# Patient Record
Sex: FEMALE | URBAN - METROPOLITAN AREA
[De-identification: names, ages, dates, MRNs, and addresses within clinical notes are randomized per-mention and may not be internally consistent; named-entity substitution may affect disease eponyms.]

---

## 2017-03-08 ENCOUNTER — APPOINTMENT (RX ONLY)
Dept: URBAN - METROPOLITAN AREA CLINIC 161 | Facility: CLINIC | Age: 77
Setting detail: DERMATOLOGY
End: 2017-03-08

## 2017-03-08 DIAGNOSIS — D22 MELANOCYTIC NEVI: ICD-10-CM

## 2017-03-08 DIAGNOSIS — L82.1 OTHER SEBORRHEIC KERATOSIS: ICD-10-CM

## 2017-03-08 DIAGNOSIS — L81.4 OTHER MELANIN HYPERPIGMENTATION: ICD-10-CM

## 2017-03-08 DIAGNOSIS — Z71.89 OTHER SPECIFIED COUNSELING: ICD-10-CM

## 2017-03-08 PROBLEM — E03.9 HYPOTHYROIDISM, UNSPECIFIED: Status: ACTIVE | Noted: 2017-03-08

## 2017-03-08 PROBLEM — J30.1 ALLERGIC RHINITIS DUE TO POLLEN: Status: ACTIVE | Noted: 2017-03-08

## 2017-03-08 PROBLEM — M12.9 ARTHROPATHY, UNSPECIFIED: Status: ACTIVE | Noted: 2017-03-08

## 2017-03-08 PROBLEM — D22.9 MELANOCYTIC NEVI, UNSPECIFIED: Status: ACTIVE | Noted: 2017-03-08

## 2017-03-08 PROBLEM — L55.1 SUNBURN OF SECOND DEGREE: Status: ACTIVE | Noted: 2017-03-08

## 2017-03-08 PROCEDURE — 99202 OFFICE O/P NEW SF 15 MIN: CPT

## 2017-03-08 PROCEDURE — ? COUNSELING

## 2017-03-08 PROCEDURE — ? REFERRAL CORRESPONDENCE

## 2021-05-07 ENCOUNTER — OFFICE VISIT (OUTPATIENT)
Dept: URBAN - METROPOLITAN AREA CLINIC 51 | Facility: CLINIC | Age: 81
End: 2021-05-07
Payer: MEDICARE

## 2021-05-07 DIAGNOSIS — E11.9 TYPE 2 DIABETES MELLITUS WITHOUT COMPLICATIONS: ICD-10-CM

## 2021-05-07 PROCEDURE — 92020 GONIOSCOPY: CPT | Performed by: OPHTHALMOLOGY

## 2021-05-07 PROCEDURE — 76514 ECHO EXAM OF EYE THICKNESS: CPT | Performed by: OPHTHALMOLOGY

## 2021-05-07 PROCEDURE — 92083 EXTENDED VISUAL FIELD XM: CPT | Performed by: OPHTHALMOLOGY

## 2021-05-07 PROCEDURE — 92250 FUNDUS PHOTOGRAPHY W/I&R: CPT | Performed by: OPHTHALMOLOGY

## 2021-05-07 PROCEDURE — 99204 OFFICE O/P NEW MOD 45 MIN: CPT | Performed by: OPHTHALMOLOGY

## 2021-05-07 RX ORDER — DORZOLAMIDE HYDROCHLORIDE AND TIMOLOL MALEATE 20; 5 MG/ML; MG/ML
SOLUTION/ DROPS OPHTHALMIC
Qty: 1 | Refills: 3 | Status: INACTIVE
Start: 2021-05-07 | End: 2021-05-07

## 2021-05-07 RX ORDER — LATANOPROST 50 UG/ML
0.005 % SOLUTION OPHTHALMIC
Qty: 1 | Refills: 3 | Status: INACTIVE
Start: 2021-05-07 | End: 2021-07-26

## 2021-05-07 NOTE — IMPRESSION/PLAN
Impression: Primary open-angle glaucoma, bilateral, indeterminate stage: K87.6181.
- DENIES:   Heart or Lung Problems, Sleep Apnea, Hx of Migraines 
- POSITIVE: Fam hx ( mom, gm) ; sulfa  Plan: PLAN: ON Brimonidine TID OS; PAchs are thick,  Photos- poor quality, and VFT has nasal depression os and iop is sub-optimal os so will adjust meds: cont brim bid os, add xal qhs OS ( rechallenge for time-being) , add cosopt bid os ( pt reports allergies in past but no asthma- discussed side effects)  and rtc 1 week for iop recheck, may recheck in general clinic then return to glaucoma as necessary - still needs dfe once iop improved   ((TARGET ~< to determine OU))     gtts sheet given 5/7/21 TESTS:  
5/7/21 Pachymetry - OD: thick and decreased risk; OS: thick and decreased risk; will use to follow 5/7/21 Visual Field - OD: Poor-overall full; OS: Poor-nasal depression 5/7/21 Photo Disc - OD: Poor-cupping; no hemes; OS Poor-cupping no hemes Discussed glaucoma diagnosis in detail with patient. Emphasized and explained compliance. Poor compliance can lead to blindness.  Educational materials provided

## 2021-05-07 NOTE — IMPRESSION/PLAN
Impression: Type 2 diabetes mellitus without complications: R71.8. Plan: Discussed diet, exercise, nutrition. Good blood sugar and blood pressure control. Maintain follow up with PCP.

## 2021-05-07 NOTE — IMPRESSION/PLAN
Impression: Puckering of macula, bilateral: H35.373.  Plan: Monitor for now., hx of ppv ou for floaters

## 2021-05-13 ENCOUNTER — OFFICE VISIT (OUTPATIENT)
Dept: URBAN - METROPOLITAN AREA CLINIC 51 | Facility: CLINIC | Age: 81
End: 2021-05-13
Payer: MEDICARE

## 2021-05-13 DIAGNOSIS — H40.1134 PRIMARY OPEN-ANGLE GLAUCOMA, BILATERAL, INDETERMINATE STAGE: Primary | ICD-10-CM

## 2021-05-13 PROCEDURE — 99204 OFFICE O/P NEW MOD 45 MIN: CPT | Performed by: OPTOMETRIST

## 2021-05-13 RX ORDER — BRIMONIDINE TARTRATE 2 MG/ML
0.2 % SOLUTION/ DROPS OPHTHALMIC
Qty: 15 | Refills: 3 | Status: INACTIVE
Start: 2021-05-13 | End: 2021-07-30

## 2021-05-13 ASSESSMENT — INTRAOCULAR PRESSURE
OD: 18
OS: 23
OS: 18

## 2021-05-13 NOTE — IMPRESSION/PLAN
Impression: Primary open-angle glaucoma, bilateral, indeterminate stage: W67.0910.
- DENIES:   Heart or Lung Problems, Sleep Apnea, Hx of Migraines 
- POSITIVE: Fam hx ( mom, gm) ; sulfa 5/7/21 Pachymetry - OD: thick and decreased risk; OS: thick and decreased risk; will use to follow 5/7/21 Visual Field - OD: Poor-overall full; OS: Poor-nasal depression 5/7/21 Photo Disc - OD: Poor-cupping; no hemes; OS Poor-cupping no hemes Plan: IOPs today 18mmHg OD and 18mmHg OS with medication. Improvement since addition of Brimonidine and Cosopt. Patient to continue with Brimonidine TID OU, Latanoprost QHS OU and Cosopt BID OU. No refills needed per patient. RTC as scheduled.

## 2021-06-15 ENCOUNTER — OFFICE VISIT (OUTPATIENT)
Dept: URBAN - METROPOLITAN AREA CLINIC 51 | Facility: CLINIC | Age: 81
End: 2021-06-15
Payer: MEDICARE

## 2021-06-15 DIAGNOSIS — H43.813 VITREOUS DEGENERATION, BILATERAL: ICD-10-CM

## 2021-06-15 DIAGNOSIS — Z96.1 PRESENCE OF INTRAOCULAR LENS: ICD-10-CM

## 2021-06-15 DIAGNOSIS — H35.372 PUCKERING OF MACULA, LEFT EYE: ICD-10-CM

## 2021-06-15 DIAGNOSIS — H35.373 PUCKERING OF MACULA, BILATERAL: ICD-10-CM

## 2021-06-15 PROCEDURE — 99214 OFFICE O/P EST MOD 30 MIN: CPT | Performed by: OPTOMETRIST

## 2021-06-15 PROCEDURE — 92133 CPTRZD OPH DX IMG PST SGM ON: CPT | Performed by: OPTOMETRIST

## 2021-06-15 PROCEDURE — 92134 CPTRZ OPH DX IMG PST SGM RTA: CPT | Performed by: OPTOMETRIST

## 2021-06-15 ASSESSMENT — KERATOMETRY
OS: 43.02
OD: 42.97

## 2021-06-15 ASSESSMENT — VISUAL ACUITY
OD: 20/30
OS: 20/30

## 2021-06-15 ASSESSMENT — INTRAOCULAR PRESSURE
OS: 15
OD: 16

## 2021-06-15 NOTE — IMPRESSION/PLAN
Impression: Puckering of macula, left eye: H35.372.  Plan: mild, monitor 
macular oct 
not visually significant

## 2021-06-15 NOTE — IMPRESSION/PLAN
Impression: Primary open-angle glaucoma, bilateral, indeterminate stage: H40.1134.  Plan: rnfl normal OD , abnormal OS 
h/o IOP 32 OS 
continue brimonidine bid OU 
latanoprost qhs OU  and cosopt bid OS
4 mos HVF and IOP

## 2021-06-15 NOTE — IMPRESSION/PLAN
Impression: Presence of intraocular lens: Z96.1. Plan: MF IOL OU 
wearing glasses wishes to be evaluated for PRK monovision

## 2021-10-20 ENCOUNTER — OFFICE VISIT (OUTPATIENT)
Dept: URBAN - METROPOLITAN AREA CLINIC 51 | Facility: CLINIC | Age: 81
End: 2021-10-20
Payer: MEDICARE

## 2021-10-20 DIAGNOSIS — H40.1122 PRIMARY OPEN-ANGLE GLAUCOMA, MODERATE STAGE, LEFT EYE: Primary | ICD-10-CM

## 2021-10-20 DIAGNOSIS — H01.001 UNSPECIFIED BLEPARITIS OF RIGHT UPPER EYELID: ICD-10-CM

## 2021-10-20 DIAGNOSIS — H01.002 UNSPECIFIED BLEPHARITIS OF RIGHT LOWER EYELID: ICD-10-CM

## 2021-10-20 DIAGNOSIS — H01.005 UNSPECIFIED BLEPHARITIS OF LEFT LOWER EYELID: ICD-10-CM

## 2021-10-20 DIAGNOSIS — H01.004 UNSPECIFIED BLEPHARITIS OF LEFT UPPER EYELID: ICD-10-CM

## 2021-10-20 DIAGNOSIS — H40.011 OPEN ANGLE WITH BORDERLINE FINDINGS, LOW RISK, RIGHT EYE: ICD-10-CM

## 2021-10-20 PROCEDURE — 92083 EXTENDED VISUAL FIELD XM: CPT | Performed by: OPTOMETRIST

## 2021-10-20 PROCEDURE — 92133 CPTRZD OPH DX IMG PST SGM ON: CPT | Performed by: OPTOMETRIST

## 2021-10-20 PROCEDURE — 99214 OFFICE O/P EST MOD 30 MIN: CPT | Performed by: OPTOMETRIST

## 2021-10-20 RX ORDER — PREDNISOLONE ACETATE 10 MG/ML
1 % SUSPENSION/ DROPS OPHTHALMIC
Qty: 10 | Refills: 3 | Status: INACTIVE
Start: 2021-10-20 | End: 2021-12-23

## 2021-10-20 ASSESSMENT — INTRAOCULAR PRESSURE
OD: 19
OS: 32

## 2021-10-20 NOTE — IMPRESSION/PLAN
Impression: Open angle with borderline findings, low risk, right eye: H40.011.  Plan: latanoprost qhs OU 
brimonidine bid OD

## 2021-10-20 NOTE — IMPRESSION/PLAN
Impression: Primary iridocyclitis of left eye: H20.012. Plan: start pred qid OS 
explained in detail needs to control inflammation in the eye 
no srx until iritis clears up.

## 2021-10-20 NOTE — IMPRESSION/PLAN
Impression: Primary open-angle glaucoma, moderate stage, left eye: H40.1122. iritis today OS 
elevated iop Plan: add rhopressa qd OS 
cosopt bid OS 
latanoprost qhs OS 
brimondine (pt wishes to use it tid ) OS 
rnfl abnormal 
HVF defects unreliable iop check 1 week

## 2021-10-27 ENCOUNTER — OFFICE VISIT (OUTPATIENT)
Dept: URBAN - METROPOLITAN AREA CLINIC 51 | Facility: CLINIC | Age: 81
End: 2021-10-27
Payer: MEDICARE

## 2021-10-27 DIAGNOSIS — H52.4 PRESBYOPIA: ICD-10-CM

## 2021-10-27 PROCEDURE — 99213 OFFICE O/P EST LOW 20 MIN: CPT | Performed by: OPTOMETRIST

## 2021-10-27 ASSESSMENT — INTRAOCULAR PRESSURE
OD: 18
OS: 19

## 2021-10-27 NOTE — IMPRESSION/PLAN
Impression: Primary iridocyclitis of left eye: H20.012. Plan: Continue PRED [id number], [id number], [id number]
continue Brimonidine till next visit IOP 18/19 today

## 2021-11-08 ENCOUNTER — OFFICE VISIT (OUTPATIENT)
Dept: URBAN - METROPOLITAN AREA CLINIC 51 | Facility: CLINIC | Age: 81
End: 2021-11-08
Payer: MEDICARE

## 2021-11-08 DIAGNOSIS — H20.012 PRIMARY IRIDOCYCLITIS OF LEFT EYE: Primary | ICD-10-CM

## 2021-11-08 PROCEDURE — 99213 OFFICE O/P EST LOW 20 MIN: CPT | Performed by: OPTOMETRIST

## 2021-11-08 RX ORDER — BRIMONIDINE TARTRATE 2 MG/ML
0.2 % SOLUTION/ DROPS OPHTHALMIC
Qty: 15 | Refills: 3 | Status: ACTIVE
Start: 2021-11-08

## 2021-11-08 ASSESSMENT — VISUAL ACUITY
OD: 20/30
OS: 20/30

## 2021-11-08 ASSESSMENT — KERATOMETRY
OD: 43.47
OS: 42.74

## 2021-11-08 NOTE — IMPRESSION/PLAN
Impression: Primary iridocyclitis of left eye: H20.012. Plan: restart PRED, [id number]
continue Brimonidine RTC 2wks for check

## 2021-12-23 ENCOUNTER — OFFICE VISIT (OUTPATIENT)
Dept: URBAN - METROPOLITAN AREA CLINIC 51 | Facility: CLINIC | Age: 81
End: 2021-12-23
Payer: MEDICARE

## 2021-12-23 PROCEDURE — 99214 OFFICE O/P EST MOD 30 MIN: CPT | Performed by: OPTOMETRIST

## 2021-12-23 RX ORDER — NETARSUDIL 0.2 MG/ML
0.02 % SOLUTION/ DROPS OPHTHALMIC; TOPICAL
Qty: 7.5 | Refills: 1 | Status: INACTIVE
Start: 2021-12-23 | End: 2021-12-28

## 2021-12-23 ASSESSMENT — INTRAOCULAR PRESSURE
OS: 28
OD: 21

## 2021-12-23 NOTE — IMPRESSION/PLAN
Impression: Primary open-angle glaucoma, moderate stage, left eye: H40.1122. iritis today OS 
elevated iop Plan: IOPs 21/28
add rhopressa qd OS 
cosopt bid OS 
latanoprost qhs OS 
brimondine bid OS 
rnfl abnormal 
HVF defects unreliable Pt refuses to take pred Glaucoma team next available

## 2021-12-23 NOTE — IMPRESSION/PLAN
Impression: Primary iridocyclitis of left eye: H20.012. Plan: pt refused pred , feels allergic 
feels change in vision due to eye pressure Kps, cells and pigment in [facility] 
will refer to glc

## 2022-05-09 ENCOUNTER — OFFICE VISIT (OUTPATIENT)
Dept: URBAN - METROPOLITAN AREA CLINIC 51 | Facility: CLINIC | Age: 82
End: 2022-05-09
Payer: MEDICARE

## 2022-05-09 DIAGNOSIS — H20.012 PRIMARY IRIDOCYCLITIS OF LEFT EYE: ICD-10-CM

## 2022-05-09 DIAGNOSIS — H40.1124 PRIMARY OPEN-ANGLE GLAUCOMA, LEFT EYE, INDETERMINATE STAGE: Primary | ICD-10-CM

## 2022-05-09 DIAGNOSIS — H43.813 VITREOUS DEGENERATION, BILATERAL: ICD-10-CM

## 2022-05-09 DIAGNOSIS — H35.372 PUCKERING OF MACULA, LEFT EYE: ICD-10-CM

## 2022-05-09 DIAGNOSIS — Z96.1 PRESENCE OF INTRAOCULAR LENS: ICD-10-CM

## 2022-05-09 PROCEDURE — 92083 EXTENDED VISUAL FIELD XM: CPT | Performed by: OPHTHALMOLOGY

## 2022-05-09 PROCEDURE — 99214 OFFICE O/P EST MOD 30 MIN: CPT | Performed by: OPHTHALMOLOGY

## 2022-05-09 ASSESSMENT — INTRAOCULAR PRESSURE
OD: 18
OS: 25

## 2022-05-09 NOTE — IMPRESSION/PLAN
Impression: Vitreous degeneration, bilateral Plan: s/p PPV OU. Discussed signs and symptoms of retinal detachment (flashes,floaters, curtain) as precaution. Patient instructed to call or return to clinic if condition gets worse.

## 2022-05-09 NOTE — IMPRESSION/PLAN
Impression: Primary iridocyclitis of left eye: H20.012.  Plan: Per Dr. Nidhi Adam pt refused pred , feels allergic 
old Kps,  appears more quiet today - avoid pg's;  if persists or worsens,  would rec eval w uveitis/DrElGasim for further eval

## 2022-05-09 NOTE — IMPRESSION/PLAN
Impression: Primary open-angle glaucoma, left eye, indeterminate stage: C05.3417. Plan: PLAN:  on Rhopressa qd OS, OFF Latanoprost qhs OS, on Cosopt bid OS(used @ 2 am, 12pm) , on Brimonidine TID+ OS(whenever feels like it ?) ; VFT may have poor rel ou and  IOP may be sub-optimal ou; remain off xal due to iritis; cont current meds - clarified regimen:  rhopressa qhs os, cosopt bid os, brim tid os,  but may have drop sensitivities,  will rec eval w Dr Chery Sharp for additional options - told to bring gtts each visit TESTS:  
5/9/22 Visual Field - OD: Fair-rim depression; OS: Fair-rim depression Discussed glaucoma diagnosis in detail with patient. Emphasized and explained compliance. Poor compliance can lead to blindness.  Educational materials provided

## 2022-05-09 NOTE — IMPRESSION/PLAN
Impression: Puckering of macula, left eye: H35.372. Plan: Discussed signs and symptoms of retinal detachment (flashes,floaters, curtain) as precaution. Patient instructed to call or return to clinic if condition gets worse.

## 2022-08-05 ENCOUNTER — OFFICE VISIT (OUTPATIENT)
Dept: URBAN - METROPOLITAN AREA CLINIC 51 | Facility: CLINIC | Age: 82
End: 2022-08-05
Payer: MEDICARE

## 2022-08-05 DIAGNOSIS — H20.012 PRIMARY IRIDOCYCLITIS OF LEFT EYE: ICD-10-CM

## 2022-08-05 DIAGNOSIS — H40.1124 PRIMARY OPEN-ANGLE GLAUCOMA, LEFT EYE, INDETERMINATE STAGE: Primary | ICD-10-CM

## 2022-08-05 PROCEDURE — 92020 GONIOSCOPY: CPT | Performed by: OPHTHALMOLOGY

## 2022-08-05 PROCEDURE — 99213 OFFICE O/P EST LOW 20 MIN: CPT | Performed by: OPHTHALMOLOGY

## 2022-08-05 PROCEDURE — 92133 CPTRZD OPH DX IMG PST SGM ON: CPT | Performed by: OPHTHALMOLOGY

## 2022-08-05 RX ORDER — LATANOPROST 0.05 MG/ML
0.005 % SOLUTION/ DROPS OPHTHALMIC; TOPICAL
Qty: 7.5 | Refills: 1 | Status: ACTIVE
Start: 2022-08-05

## 2022-08-05 ASSESSMENT — INTRAOCULAR PRESSURE
OS: 29
OD: 19

## 2022-08-05 NOTE — IMPRESSION/PLAN
Impression: Primary open-angle glaucoma, left eye, indeterminate stage: B72.5408. Plan: IOP is elevated OU, recommend lowing IOP. discussed additional drops vs laser treatment. discussed risks due to prior iritis. She states latanoprost was stopped because it burned, not because of inflammation so will start xelpros QHS OS. 
Continue: Rhopressa QHS OS,  Cosopt bid OS , increase Brimonidine to TID OS; Start Xelpros QHS OU
RTC in 1-2 months for IOP check Dr. Jayashree Xavier

## 2022-09-16 ENCOUNTER — OFFICE VISIT (OUTPATIENT)
Dept: URBAN - METROPOLITAN AREA CLINIC 44 | Facility: CLINIC | Age: 82
End: 2022-09-16
Payer: MEDICARE

## 2022-09-16 DIAGNOSIS — H43.813 VITREOUS DEGENERATION, BILATERAL: ICD-10-CM

## 2022-09-16 DIAGNOSIS — H40.1124 PRIMARY OPEN-ANGLE GLAUCOMA, LEFT EYE, INDETERMINATE STAGE: Primary | ICD-10-CM

## 2022-09-16 DIAGNOSIS — H20.012 PRIMARY IRIDOCYCLITIS OF LEFT EYE: ICD-10-CM

## 2022-09-16 DIAGNOSIS — H35.372 PUCKERING OF MACULA, LEFT EYE: ICD-10-CM

## 2022-09-16 PROCEDURE — 99214 OFFICE O/P EST MOD 30 MIN: CPT | Performed by: OPHTHALMOLOGY

## 2022-09-16 RX ORDER — BRIMONIDINE TARTRATE 2 MG/ML
0.2 % SOLUTION/ DROPS OPHTHALMIC
Qty: 15 | Refills: 3 | Status: ACTIVE
Start: 2022-09-16

## 2022-09-16 RX ORDER — LATANOPROST 0.05 MG/ML
0.005 % SOLUTION/ DROPS OPHTHALMIC; TOPICAL
Qty: 7.5 | Refills: 1 | Status: ACTIVE
Start: 2022-09-16

## 2022-09-16 RX ORDER — NETARSUDIL 0.2 MG/ML
0.02 % SOLUTION/ DROPS OPHTHALMIC; TOPICAL
Qty: 7.5 | Refills: 1 | Status: ACTIVE
Start: 2022-09-16

## 2022-09-16 RX ORDER — DORZOLAMIDE HYDROCHLORIDE AND TIMOLOL MALEATE 20; 5 MG/ML; MG/ML
SOLUTION/ DROPS OPHTHALMIC
Qty: 15 | Refills: 3 | Status: ACTIVE
Start: 2022-09-16

## 2022-09-16 ASSESSMENT — INTRAOCULAR PRESSURE
OS: 21
OD: 18

## 2022-09-16 NOTE — IMPRESSION/PLAN
Impression: Primary open-angle glaucoma, left eye, indeterminate stage: P62.5033. Plan: PLAN: On Rhopressa QHS OS, ON Xelpros QHS OS (from Latanoprost), on Cosopt BID OS, on Brimonidine BID OS (should be TID OS); IOP has improved; started on xelpros by Dr Tamiko Choudhury , but has history of uveitis, would avoid if possible, increase brim to tid and avoid xelpros for now , keep others the same and rec kei Motley for additional options in 2-4 weeks : - told to bring gtts each visit TESTS:  
5/9/22 Visual Field - OD: Fair-rim depression; OS: Fair-rim depression Discussed glaucoma diagnosis in detail with patient. Emphasized and explained compliance. Poor compliance can lead to blindness.  Educational materials provided

## 2022-09-16 NOTE — IMPRESSION/PLAN
Impression: Primary iridocyclitis of left eye: H20.012.  Plan: Per Dr. Markham Apley pt refused pred , feels allergic 
old Kps,  appears more quiet today - avoid pg's;  if persists or worsens,  would rec eval w uveitis/DrElGasim for further eval

## 2022-12-13 ENCOUNTER — OFFICE VISIT (OUTPATIENT)
Dept: URBAN - METROPOLITAN AREA CLINIC 51 | Facility: CLINIC | Age: 82
End: 2022-12-13
Payer: MEDICARE

## 2022-12-13 DIAGNOSIS — H20.012 PRIMARY IRIDOCYCLITIS OF LEFT EYE: Primary | ICD-10-CM

## 2022-12-13 DIAGNOSIS — H40.1122 PRIMARY OPEN-ANGLE GLAUCOMA, MODERATE STAGE, LEFT EYE: ICD-10-CM

## 2022-12-13 PROCEDURE — 99213 OFFICE O/P EST LOW 20 MIN: CPT | Performed by: OPTOMETRIST

## 2022-12-13 ASSESSMENT — INTRAOCULAR PRESSURE
OS: 32
OD: 17

## 2022-12-13 NOTE — IMPRESSION/PLAN
Impression: Primary iridocyclitis of left eye: H20.012.  Plan:  pt refused pred , feels allergic 
old Kps,  appears more quiet today - avoid pg's;  if persists or worsens,  would rec eval w uveitis/DrElGasim for further eval

## 2022-12-13 NOTE — IMPRESSION/PLAN
Impression: Primary open-angle glaucoma, moderate stage, left eye: H40.1122. Plan: possible secondary glc related to iritis 
rnfl/ gca thining OS 
cupping OS> OD 
iop 32 today 
pt has d/c drops on own due to irritation and redness. will refer for possible laser or surgical procedure to avoid use of drops.

## 2022-12-30 ENCOUNTER — OFFICE VISIT (OUTPATIENT)
Dept: URBAN - METROPOLITAN AREA CLINIC 51 | Facility: CLINIC | Age: 82
End: 2022-12-30
Payer: MEDICARE

## 2022-12-30 DIAGNOSIS — H35.372 PUCKERING OF MACULA, LEFT EYE: ICD-10-CM

## 2022-12-30 DIAGNOSIS — H40.1122 PRIMARY OPEN-ANGLE GLAUCOMA, MODERATE STAGE, LEFT EYE: Primary | ICD-10-CM

## 2022-12-30 DIAGNOSIS — H20.012 PRIMARY IRIDOCYCLITIS OF LEFT EYE: ICD-10-CM

## 2022-12-30 DIAGNOSIS — H43.813 VITREOUS DEGENERATION, BILATERAL: ICD-10-CM

## 2022-12-30 DIAGNOSIS — H40.011 OPEN ANGLE WITH BORDERLINE FINDINGS, LOW RISK, RIGHT EYE: ICD-10-CM

## 2022-12-30 PROCEDURE — 99214 OFFICE O/P EST MOD 30 MIN: CPT | Performed by: STUDENT IN AN ORGANIZED HEALTH CARE EDUCATION/TRAINING PROGRAM

## 2022-12-30 PROCEDURE — 92083 EXTENDED VISUAL FIELD XM: CPT | Performed by: STUDENT IN AN ORGANIZED HEALTH CARE EDUCATION/TRAINING PROGRAM

## 2022-12-30 PROCEDURE — 92020 GONIOSCOPY: CPT | Performed by: STUDENT IN AN ORGANIZED HEALTH CARE EDUCATION/TRAINING PROGRAM

## 2022-12-30 RX ORDER — PREDNISOLONE ACETATE 10 MG/ML
1 % SUSPENSION/ DROPS OPHTHALMIC
Qty: 10 | Refills: 1 | Status: ACTIVE
Start: 2022-12-30

## 2022-12-30 RX ORDER — GENTAMICIN SULFATE 3 MG/ML
0.3 % SOLUTION/ DROPS OPHTHALMIC
Qty: 5 | Refills: 1 | Status: ACTIVE
Start: 2022-12-30

## 2022-12-30 ASSESSMENT — INTRAOCULAR PRESSURE
OS: 33
OD: 18

## 2022-12-30 NOTE — IMPRESSION/PLAN
Impression: Primary open-angle glaucoma, moderate stage, left eye: H40.1122. OAG 2/2 inflammation Plan: Ocular Surgical History: s/p PPVIT OU Pachy:  614 OU Tmax:  21 / 32 Target IOP: mid-low teens OU Med Allergies: pt stopped all gtts due to irritation/redness Heart / Lung / Kidney disease/sulfa allergy: Pt. denies Gonioscopy: OD 2+ pigment grade 4 : OS 2+ pigment small inf pas grade 4
OCT: (8/5/22) 85 / 71 HVF: (12/30/22) OD: Fair-rim depression; OS: Fair-rim depression C/D: 0.65 / 0.75 Better seeing eye:  OS
IOP Today: 18 / 33 Plan: IOP today is okay OD, but elevated OS. IOP is too high OS for amount of glaucoma present at this time. Recommend lowering IOP OS. Discussed Omni vs Tube shunt. Glaucoma Tube Shunt OS (baerveldt) is recommended. Glaucoma diagnosis and associated vision loss discussed at length. R/B/A of surgery discussed including pain, bleeding, loss of vision, loss of eye, need for further surgery, double vision, retinal problems, infection, and inflammation. Although surgery is expected to improve the condition, the patient understands it is possible that the condition could worsen in the future. The surgery is being done in an attempt to preserve the current level of vision; vision improvement is not expected. Discussed in detail the commitment of post operative care, patient may need to travel to different offices. Discussed post-op activity restrictions: no bending head below waist, rubbing the eye, straining or lifting over 10 lbs, no swimming, stay out of randy, dirty areas and shield when sleeping until advised. All questions answered. Pt understand and wishes to proceed. See Елена Hart to schedule Baerveldt tube shunt with patch graft in the LEFT eye, H&P,  POD1, POW1, POW3 and POW5-6. 
***Clearance? ?? 
 - Educational material provided. - ERx'd Ofloxacin QID OS, and Prednisolone Acetate QID OS as requested. - Continue Glaucoma medications as directed before surgery. After surgery, patient will stop glaucoma drops ONLY in the surgical eye. Start surgical drops when eye patch is removed 5hrs after surgery. Drops:  pt stopped all gtts due to irritation and redness Discussed natural history of glaucoma and that irreversible vision loss can occur without adequate IOP control.

## 2022-12-30 NOTE — IMPRESSION/PLAN
Impression: Vitreous degeneration, bilateral Plan: s/p PPV OU. Discussed natural history of PVD. Reviewed signs and symptoms of a Retinal Detachment. Patient instructed to call if symptoms should arise.

## 2022-12-30 NOTE — IMPRESSION/PLAN
Impression: Primary iridocyclitis of left eye: H20.012.
-avoid PGAs Plan: No inflammation present today. Recommend consult w/ Dr. Adam Huerta is retimoteo. Monitor.

## 2023-01-12 ENCOUNTER — SURGERY (OUTPATIENT)
Dept: URBAN - METROPOLITAN AREA SURGERY 28 | Facility: LOCATION | Age: 83
End: 2023-01-12
Payer: MEDICARE

## 2023-01-12 PROCEDURE — 66180 AQUEOUS SHUNT EYE W/GRAFT: CPT | Performed by: STUDENT IN AN ORGANIZED HEALTH CARE EDUCATION/TRAINING PROGRAM

## 2023-01-13 ENCOUNTER — POST-OPERATIVE VISIT (OUTPATIENT)
Dept: URBAN - METROPOLITAN AREA CLINIC 51 | Facility: CLINIC | Age: 83
End: 2023-01-13
Payer: MEDICARE

## 2023-01-13 DIAGNOSIS — Z48.810 ENCOUNTER FOR SURGICAL AFTERCARE FOLLOWING SURGERY ON A SENSE ORGAN: Primary | ICD-10-CM

## 2023-01-13 PROCEDURE — 99024 POSTOP FOLLOW-UP VISIT: CPT | Performed by: OPTOMETRIST

## 2023-01-13 ASSESSMENT — INTRAOCULAR PRESSURE
OS: 19
OS: 15

## 2023-01-13 NOTE — IMPRESSION/PLAN
Impression: S/P Shunt:  Baerveldt; TUTOPLAST PROCESSED PERICARDIUM OS - 1 Day. Encounter for surgical aftercare following surgery on a sense organ  Z48.810. Plan: Doing well 1 day post op. ST tube shunt with good position. IOP doing excellent OS (15). Subconjunctival hemorrhage will resolve with time, however, can take upwards of a few weeks. Swelling and inflammation noted today. VA should continue to improve as swelling and inflammation resolves. Restrictions apply for 1 week. Drop instructions: Pred QID then taper + Ofloxacin QID x 2 weeks (wait at least 5 minutes in between drops). AT as needed between medicated drops (sample if Ivizia dispensed to patient) RTC as scheduled for 1 week post op appointment

## 2023-01-19 ENCOUNTER — POST-OPERATIVE VISIT (OUTPATIENT)
Dept: URBAN - METROPOLITAN AREA CLINIC 51 | Facility: CLINIC | Age: 83
End: 2023-01-19
Payer: MEDICARE

## 2023-01-19 DIAGNOSIS — Z48.810 ENCOUNTER FOR SURGICAL AFTERCARE FOLLOWING SURGERY ON A SENSE ORGAN: Primary | ICD-10-CM

## 2023-01-19 PROCEDURE — 99024 POSTOP FOLLOW-UP VISIT: CPT | Performed by: OPTOMETRIST

## 2023-01-19 PROCEDURE — 92134 CPTRZ OPH DX IMG PST SGM RTA: CPT | Performed by: OPTOMETRIST

## 2023-01-19 RX ORDER — PREDNISOLONE ACETATE 10 MG/ML
1 % SUSPENSION/ DROPS OPHTHALMIC
Qty: 10 | Refills: 1 | Status: ACTIVE
Start: 2023-01-19

## 2023-01-19 RX ORDER — ATROPINE SULFATE 10 MG/ML
1 % SOLUTION/ DROPS OPHTHALMIC
Qty: 5 | Refills: 0 | Status: ACTIVE
Start: 2023-01-19

## 2023-01-19 ASSESSMENT — INTRAOCULAR PRESSURE
OD: 15
OS: 3

## 2023-01-20 NOTE — IMPRESSION/PLAN
Impression: S/P Shunt:  Baerveldt; TUTOPLAST PROCESSED PERICARDIUM OS - 7 Days. Encounter for surgical aftercare following surgery on a sense organ  Z48.810. Plan: IOP remains low and now developing choroidal.
Discussed with patient. Verified with Dr. Elizabeth Soto -- start atropine bid OS and increase pred 6x OS. 

RTC next week with OD; prefer Friday as Dr. Elizabeth Soto is in clinic if needed

## 2023-01-27 ENCOUNTER — POST-OPERATIVE VISIT (OUTPATIENT)
Dept: URBAN - METROPOLITAN AREA CLINIC 51 | Facility: CLINIC | Age: 83
End: 2023-01-27
Payer: MEDICARE

## 2023-01-27 DIAGNOSIS — Z48.810 ENCOUNTER FOR SURGICAL AFTERCARE FOLLOWING SURGERY ON A SENSE ORGAN: Primary | ICD-10-CM

## 2023-01-27 PROCEDURE — 99024 POSTOP FOLLOW-UP VISIT: CPT

## 2023-01-27 ASSESSMENT — INTRAOCULAR PRESSURE
OS: 7
OS: 12

## 2023-02-07 ENCOUNTER — OFFICE VISIT (OUTPATIENT)
Facility: LOCATION | Age: 83
End: 2023-02-07
Payer: MEDICARE

## 2023-02-07 DIAGNOSIS — Z48.810 ENCOUNTER FOR SURGICAL AFTERCARE FOLLOWING SURGERY ON A SENSE ORGAN: ICD-10-CM

## 2023-02-07 DIAGNOSIS — H40.1122 PRIMARY OPEN-ANGLE GLAUCOMA, MODERATE STAGE, LEFT EYE: Primary | ICD-10-CM

## 2023-02-07 DIAGNOSIS — H20.012 PRIMARY IRIDOCYCLITIS OF LEFT EYE: ICD-10-CM

## 2023-02-07 PROCEDURE — 99024 POSTOP FOLLOW-UP VISIT: CPT | Performed by: STUDENT IN AN ORGANIZED HEALTH CARE EDUCATION/TRAINING PROGRAM

## 2023-02-07 ASSESSMENT — INTRAOCULAR PRESSURE
OD: 17
OS: 6
OS: 32

## 2023-02-07 NOTE — IMPRESSION/PLAN
Impression: Primary iridocyclitis of left eye: H20.012. Plan: Eye is quiet today, PF 6x a day for post-surgical care.

## 2023-02-13 ENCOUNTER — OFFICE VISIT (OUTPATIENT)
Dept: URBAN - METROPOLITAN AREA CLINIC 44 | Facility: CLINIC | Age: 83
End: 2023-02-13
Payer: MEDICARE

## 2023-02-13 DIAGNOSIS — H20.012 PRIMARY IRIDOCYCLITIS OF LEFT EYE: ICD-10-CM

## 2023-02-13 DIAGNOSIS — Z48.810 ENCOUNTER FOR SURGICAL AFTERCARE FOLLOWING SURGERY ON A SENSE ORGAN: Primary | ICD-10-CM

## 2023-02-13 PROCEDURE — 99024 POSTOP FOLLOW-UP VISIT: CPT | Performed by: OPHTHALMOLOGY

## 2023-02-13 ASSESSMENT — INTRAOCULAR PRESSURE
OD: 15
OS: 6

## 2023-02-13 NOTE — IMPRESSION/PLAN
Impression: S/P Shunt:  Baerveldt; TUTOPLAST PROCESSED PERICARDIUM OS. Encounter for surgical aftercare following surgery on a sense organ  Z48.810. Plan: Doing well with mild K edema OS, sutures intact Baljit negative, wound intact I suspect tube shunt may be partially open - challenging to see shunt base due to deep set orbit Return to Dr. Devin Strange for continued care in 2 weeks - went tube should be close to opening Increase PF to QID and continue atropine BID OS

## 2023-03-10 ENCOUNTER — OFFICE VISIT (OUTPATIENT)
Dept: URBAN - METROPOLITAN AREA CLINIC 51 | Facility: CLINIC | Age: 83
End: 2023-03-10
Payer: MEDICARE

## 2023-03-10 DIAGNOSIS — Z48.810 ENCOUNTER FOR SURGICAL AFTERCARE FOLLOWING SURGERY ON A SENSE ORGAN: Primary | ICD-10-CM

## 2023-03-10 PROCEDURE — 99024 POSTOP FOLLOW-UP VISIT: CPT | Performed by: STUDENT IN AN ORGANIZED HEALTH CARE EDUCATION/TRAINING PROGRAM

## 2023-03-10 ASSESSMENT — INTRAOCULAR PRESSURE
OD: 16
OS: 11

## 2023-03-10 NOTE — IMPRESSION/PLAN
Impression: S/P Shunt:  Baerveldt; TUTOPLAST PROCESSED PERICARDIUM OS. Encounter for surgical aftercare following surgery on a sense organ  Z48.810. Plan: Ocular Surgical History: s/p IOL OU, PPVIT OU, Baerveldt tube OS Pachy:  381/648 Tmax:  21/33 Target IOP: mid-low teens OU Med Allergies: pt stopped all gtts due to irritation/redness Heart / Lung / Kidney disease/sulfa allergy: Pt. denies Gonioscopy: OD 2+ pigment grade 4 : OS 2+ pigment small inf pas grade 4
OCT: (8/5/22) 85 wnl/71 early pole thinning HVF: (12/30/22) OD: MD 0, early NS inf changes/OS: MD -3 early NS inf changes, sup step C/D: .65/.75 Better seeing eye:  OS
IOP Today: 16/11 Plan: IOP is doing well. Patient to use PF AFT OU often for dryness and irritation. Will continue to monitor. *Activity restrictions are lifted* Drops: STOP Atropine, DECREASE Prednisolone from QID to TID OS

## 2023-04-11 ENCOUNTER — POST-OPERATIVE VISIT (OUTPATIENT)
Dept: URBAN - METROPOLITAN AREA CLINIC 51 | Facility: CLINIC | Age: 83
End: 2023-04-11
Payer: MEDICARE

## 2023-04-11 DIAGNOSIS — Z48.810 ENCOUNTER FOR SURGICAL AFTERCARE FOLLOWING SURGERY ON A SENSE ORGAN: Primary | ICD-10-CM

## 2023-04-11 PROCEDURE — 99024 POSTOP FOLLOW-UP VISIT: CPT | Performed by: OPTOMETRIST

## 2023-04-11 ASSESSMENT — INTRAOCULAR PRESSURE
OD: 17
OS: 14

## 2023-04-11 NOTE — IMPRESSION/PLAN
Impression: S/P Shunt:  Baerveldt; TUTOPLAST PROCESSED PERICARDIUM OS - 89 Days. Encounter for surgical aftercare following surgery on a sense organ  Z48.810. Plan: IOP doing well s/p shunt. No cells. Will have patient decrease Pred to BID. AT's as needed for comfort. RTC in 1 month for IOP check.

## 2023-05-23 ENCOUNTER — OFFICE VISIT (OUTPATIENT)
Dept: URBAN - METROPOLITAN AREA CLINIC 51 | Facility: CLINIC | Age: 83
End: 2023-05-23
Payer: MEDICARE

## 2023-05-23 DIAGNOSIS — H40.011 OPEN ANGLE WITH BORDERLINE FINDINGS, LOW RISK, RIGHT EYE: ICD-10-CM

## 2023-05-23 DIAGNOSIS — H40.1122 PRIMARY OPEN-ANGLE GLAUCOMA, MODERATE STAGE, LEFT EYE: Primary | ICD-10-CM

## 2023-05-23 PROCEDURE — 99213 OFFICE O/P EST LOW 20 MIN: CPT | Performed by: OPTOMETRIST

## 2023-05-23 ASSESSMENT — INTRAOCULAR PRESSURE
OS: 16
OD: 16

## 2023-05-23 NOTE — IMPRESSION/PLAN
Impression: Open angle with borderline findings, low risk, right eye: H40.011. Plan: See plan K14.6978.

## 2023-05-23 NOTE — IMPRESSION/PLAN
Impression: Primary open-angle glaucoma, moderate stage, left eye: H40.1122. *S/p shunt OS Plan: IOPs today: 16/16 s/p shunt OS Reviewed today's findings with patient. Healing well. IOPs doing well. No cells noted. Not using Pred as instructed - using qd - qod. Drop instructions:
Pred once daily in the left eye only for 2 weeks, then stop RTC in 2 months for IOP check + refract

## 2023-07-20 ENCOUNTER — OFFICE VISIT (OUTPATIENT)
Dept: URBAN - METROPOLITAN AREA CLINIC 51 | Facility: CLINIC | Age: 83
End: 2023-07-20
Payer: MEDICARE

## 2023-07-20 DIAGNOSIS — H40.011 OPEN ANGLE WITH BORDERLINE FINDINGS, LOW RISK, RIGHT EYE: ICD-10-CM

## 2023-07-20 DIAGNOSIS — H52.4 PRESBYOPIA: ICD-10-CM

## 2023-07-20 DIAGNOSIS — H40.1122 PRIMARY OPEN-ANGLE GLAUCOMA, MODERATE STAGE, LEFT EYE: Primary | ICD-10-CM

## 2023-07-20 PROCEDURE — 99213 OFFICE O/P EST LOW 20 MIN: CPT | Performed by: OPTOMETRIST

## 2023-07-20 ASSESSMENT — VISUAL ACUITY
OD: 20/30
OS: 20/30

## 2023-07-20 ASSESSMENT — INTRAOCULAR PRESSURE
OD: 18
OS: 16

## 2023-07-20 NOTE — IMPRESSION/PLAN
Impression: Primary open-angle glaucoma, moderate stage, left eye: H40.1122. *S/p shunt OS Plan: IOPs today: 18/16 s/p shunt OS Reviewed today's findings with patient. Healing well. IOPs doing well. No cells. noted. Will continue to monitor without medication. 

RTC in 3 months for 24-2c HVF + IOP check

## 2023-07-20 NOTE — IMPRESSION/PLAN
Impression: Presbyopia: H52.4.
 -MFIOL Plan: Patient is satisfied with current level of vision and declined today's SRx.

## 2025-06-05 ENCOUNTER — OFFICE VISIT (OUTPATIENT)
Dept: URBAN - METROPOLITAN AREA CLINIC 51 | Facility: CLINIC | Age: 85
End: 2025-06-05
Payer: MEDICARE

## 2025-06-05 DIAGNOSIS — H52.4 PRESBYOPIA: ICD-10-CM

## 2025-06-05 DIAGNOSIS — H02.051 TRICHIASIS WITHOUT ENTROPION RIGHT UPPER EYELID: ICD-10-CM

## 2025-06-05 DIAGNOSIS — Z96.1 PRESENCE OF INTRAOCULAR LENS: ICD-10-CM

## 2025-06-05 DIAGNOSIS — H04.123 TEAR FILM INSUFFICIENCY OF BILATERAL LACRIMAL GLANDS: ICD-10-CM

## 2025-06-05 DIAGNOSIS — H02.054 TRICHIASIS W/O ENTROPION OF LEFT UPPER EYELID: ICD-10-CM

## 2025-06-05 DIAGNOSIS — H40.011 OPEN ANGLE WITH BORDERLINE FINDINGS, LOW RISK, RIGHT EYE: ICD-10-CM

## 2025-06-05 DIAGNOSIS — H26.492 OTHER SECONDARY CATARACT, LEFT EYE: ICD-10-CM

## 2025-06-05 DIAGNOSIS — H43.813 VITREOUS DEGENERATION, BILATERAL: ICD-10-CM

## 2025-06-05 DIAGNOSIS — H40.1122 PRIMARY OPEN-ANGLE GLAUCOMA, MODERATE STAGE, LEFT EYE: Primary | ICD-10-CM

## 2025-06-05 PROCEDURE — 99214 OFFICE O/P EST MOD 30 MIN: CPT | Performed by: OPTOMETRIST

## 2025-06-05 PROCEDURE — 92134 CPTRZ OPH DX IMG PST SGM RTA: CPT | Performed by: OPTOMETRIST

## 2025-06-05 PROCEDURE — 92133 CPTRZD OPH DX IMG PST SGM ON: CPT | Performed by: OPTOMETRIST

## 2025-06-05 RX ORDER — TIMOLOL MALEATE OPHTHALMIC 5 MG/ML
0.5 % SOLUTION/ DROPS OPHTHALMIC
Qty: 5 | Refills: 3 | Status: ACTIVE
Start: 2025-06-05

## 2025-06-05 ASSESSMENT — INTRAOCULAR PRESSURE
OS: 19
OD: 20

## 2025-06-05 ASSESSMENT — VISUAL ACUITY
OS: 20/50
OD: 20/40

## 2025-06-05 ASSESSMENT — KERATOMETRY
OD: 43.00
OS: 42.38

## 2025-08-20 ENCOUNTER — OFFICE VISIT (OUTPATIENT)
Dept: URBAN - METROPOLITAN AREA CLINIC 51 | Facility: CLINIC | Age: 85
End: 2025-08-20
Payer: MEDICARE

## 2025-08-20 DIAGNOSIS — H40.1122 PRIMARY OPEN-ANGLE GLAUCOMA, LEFT EYE, MODERATE STAGE: Primary | ICD-10-CM

## 2025-08-20 DIAGNOSIS — H40.011 OPEN ANGLE WITH BORDERLINE FINDINGS, LOW RISK, RIGHT EYE: ICD-10-CM

## 2025-08-20 PROCEDURE — 99213 OFFICE O/P EST LOW 20 MIN: CPT | Performed by: OPTOMETRIST

## 2025-08-20 PROCEDURE — 92083 EXTENDED VISUAL FIELD XM: CPT | Performed by: OPTOMETRIST

## 2025-08-20 ASSESSMENT — KERATOMETRY
OS: 42.38
OD: 42.38

## 2025-08-20 ASSESSMENT — INTRAOCULAR PRESSURE
OS: 15
OD: 17

## 2025-08-20 ASSESSMENT — VISUAL ACUITY
OS: 20/40
OD: 20/20